# Patient Record
(demographics unavailable — no encounter records)

---

## 2025-04-17 NOTE — COUNSELING
[Cessation strategies including cessation program discussed] : Cessation strategies including cessation program discussed [Use of nicotine replacement therapies and other medications discussed] : Use of nicotine replacement therapies and other medications discussed [Encouraged to pick a quit date and identify support needed to quit] : Encouraged to pick a quit date and identify support needed to quit [Potential consequences of obesity discussed] : Potential consequences of obesity discussed [Benefits of weight loss discussed] : Benefits of weight loss discussed [Structured Weight Management Program suggested:] : Structured weight management program suggested [Encouraged to maintain food diary] : Encouraged to maintain food diary [Encouraged to increase physical activity] : Encouraged to increase physical activity [ - Annual Lung Cancer Screening/Share Decision Making Discussion] : Annual Lung Cancer Screening/Share Decision Making Discussion. (I have advised this patient to have a Low Dose CT (LDCT) scan of the lungs and have discussed the following with the patient in a shared decision making discussion:   Benefits of Detection and Early Treatment: There is adequate evidence that annual screening for lung cancer with LDCT in a population of high-risk persons can prevent a substantial number of lung cancer-related deaths. The magnitude of benefit depends on the individual patient's risk for lung cancer, as those who are at highest risk are most likely to benefit. Screening cannot prevent most lung cancer-related deaths, and does not replace smoking cessation. Harms of Detection and Early Intervention and Treatment: The harms associated with LDCT screening include false-negative and false-positive results, incidental findings, over diagnosis, and radiation exposure. False-positive LDCT results occur in a substantial proportion of screened persons; 95% of all positive results do not lead to a diagnosis of cancer. In a high-quality screening program, further imaging can resolve most false-positive results; however, some patients may require invasive procedures. Radiation harms, including cancer resulting from cumulative exposure to radiation, vary depending on the age at the start of screening; the number of scans received; and the person's exposure to other sources of radiation, particularly other medical imaging.)

## 2025-04-17 NOTE — DISCUSSION/SUMMARY
[FreeTextEntry1] : Assess  Moderate allergic and exertional asthma - most active in pollen season -  Mild to moderate COPD Mild positional DONNY - does not contribute to polycythemia (purely related to CO in cigarettes) Nicotine dependence with increase COHgb and HCT  Trouble sleeping on Symbicort  Dislikes powdered inhaled medication Optic neuritis - poor site.  Can't drive  Taking 2 buses then walking a mile to work is not possible for her GERD Allergic rhinitis Intolerant to Cipro and Bactrim   Rec  Discuss weight loss with PCP ; GLP-1 receptor agonist might help Consider brief Wellbutrin to blunt Smoke cessation weight gain Nicotine replacement and minimize cigarettes (CO => polycythemia) Avoid supine sleep - methods reviewed with patient  Azelastine and Breztri Work accommodations: No long walks or heavy lifting. Avoid dusts, perfumes, cleaning agents  LDCT lung cancer screening 2 month FU with mark

## 2025-04-17 NOTE — RESULTS/DATA
[TextEntry] : [facility] HST on 8/11/23: AHI=8.7; REM AHI=18; Supine AHI=19.8; RDI=11.9; 6.4 min with 02sat <=88% (mostly in supine REM) LDCT on 8/21/23: No noncalcified nodules. Paraseptal and centrilobular emphysema.

## 2025-04-17 NOTE — HISTORY OF PRESENT ILLNESS
[FreeTextEntry1] : Smoker- 27 pack years ESPINOSA No nasal polyps Probable environmental allergies  Aunt had lung cancer Maintained on Symbicort Recent throat closing on prednisone Recent thrush on symbicort GERD and PND  5/7/20 More frequent wheeze with pollen Paradoxical poor sleep with montelukast and with azelastine Benadryl, 50 mg hs helpful [facility]   5/28/20 Principally sinus complaints despite ABx and Azelastine Breathing is OK Still on low dose prednisone   9/24/20 Allergy profile neg - possibly because of chronic Benadryl use Mild cough Abdominal bloating and pain.  Intermittent constipation Rhinorrhea   1/19/21 Nasal congestion SOB Works at [facility]  Cardiac CT on 1/13/21 - Calcium=0; Emphysema, mild bronchial wall thickening Still smoking - in smoke cessation program at [facility]  3/24/21 Cough UTI Smoking more working days at [facility] entrance [facility] and cough  1/24/22 Moved to [place] Working remotely for [employer] Gained 20 LBS Wz and SOB Mult allergies - afraid to get COVID vaccine Children have had COVID Needs Symbicort , albuterol and Azelastin   8/24/22 COVID in June, 2022 Chest heaviness and dyspnea - R'd out MI in July , 2022 GERD RHinitis Snoring and fatigue   9/23/22 Fall with splint on Left No better with oxycodone 02sat to 90 at worst   7/21/23 ESPINOSA Still smoking Polycythemia Elevated COHgb Has cataract - afraid of ICS 5 minute discussion of smoking related illness, smoke cessation methods and smoke cessation resources  8/31/23 Worse sleep off hormone replacement Elevated CarboxyHgb from continued smoking Weight gained as cut back on cigarettes  4/17/25 Not seen since August of 2023  I reviewed all recent notes, orders, lab results and images for 15 minutes on all available platforms (including [employer], [employer], [employer], and [employer] Epic prior to this visit and made notes.  Inflammatory arthritis Optic neuritis Can't drive Exertional and allergic asthma plus reactive airways dysfunction Needs work accommodations  5 minute discussion of smoking related illness, smoke cessation methods and smoke cessation resources